# Patient Record
Sex: MALE | Race: WHITE
[De-identification: names, ages, dates, MRNs, and addresses within clinical notes are randomized per-mention and may not be internally consistent; named-entity substitution may affect disease eponyms.]

---

## 2018-08-26 ENCOUNTER — HOSPITAL ENCOUNTER (INPATIENT)
Dept: HOSPITAL 54 - ER | Age: 63
LOS: 2 days | Discharge: HOME | DRG: 205 | End: 2018-08-28
Attending: NURSE PRACTITIONER | Admitting: NURSE PRACTITIONER
Payer: COMMERCIAL

## 2018-08-26 VITALS — BODY MASS INDEX: 29.68 KG/M2 | WEIGHT: 212 LBS | HEIGHT: 71 IN

## 2018-08-26 DIAGNOSIS — E66.9: ICD-10-CM

## 2018-08-26 DIAGNOSIS — R07.9: ICD-10-CM

## 2018-08-26 DIAGNOSIS — D72.829: ICD-10-CM

## 2018-08-26 DIAGNOSIS — E78.5: ICD-10-CM

## 2018-08-26 DIAGNOSIS — F41.9: ICD-10-CM

## 2018-08-26 DIAGNOSIS — I10: ICD-10-CM

## 2018-08-26 DIAGNOSIS — M94.0: Primary | ICD-10-CM

## 2018-08-26 DIAGNOSIS — I25.2: ICD-10-CM

## 2018-08-26 DIAGNOSIS — K21.9: ICD-10-CM

## 2018-08-26 DIAGNOSIS — I25.10: ICD-10-CM

## 2018-08-26 DIAGNOSIS — E87.1: ICD-10-CM

## 2018-08-26 DIAGNOSIS — K27.9: ICD-10-CM

## 2018-08-26 DIAGNOSIS — Z88.0: ICD-10-CM

## 2018-08-26 DIAGNOSIS — N17.0: ICD-10-CM

## 2018-08-26 DIAGNOSIS — I16.0: ICD-10-CM

## 2018-08-26 DIAGNOSIS — Z82.49: ICD-10-CM

## 2018-08-26 DIAGNOSIS — Z98.61: ICD-10-CM

## 2018-08-26 DIAGNOSIS — Z87.19: ICD-10-CM

## 2018-08-26 LAB
APTT PPP: 25 SEC (ref 23–34)
BASOPHILS # BLD AUTO: 0 /CMM (ref 0–0.2)
BASOPHILS NFR BLD AUTO: 0.3 % (ref 0–2)
BUN SERPL-MCNC: 27 MG/DL (ref 7–18)
CALCIUM SERPL-MCNC: 9.1 MG/DL (ref 8.5–10.1)
CHLORIDE SERPL-SCNC: 99 MMOL/L (ref 98–107)
CO2 SERPL-SCNC: 25 MMOL/L (ref 21–32)
CREAT SERPL-MCNC: 1.4 MG/DL (ref 0.6–1.3)
EOSINOPHIL NFR BLD AUTO: 0.1 % (ref 0–6)
GLUCOSE SERPL-MCNC: 143 MG/DL (ref 74–106)
HCT VFR BLD AUTO: 51 % (ref 39–51)
HGB BLD-MCNC: 16.8 G/DL (ref 13.5–17.5)
INR PPP: 0.96 (ref 0.87–1.13)
LYMPHOCYTES NFR BLD AUTO: 1.2 /CMM (ref 0.8–4.8)
LYMPHOCYTES NFR BLD AUTO: 9.4 % (ref 20–44)
MCH RBC QN AUTO: 31 PG (ref 26–33)
MCHC RBC AUTO-ENTMCNC: 33 G/DL (ref 31–36)
MCV RBC AUTO: 93 FL (ref 80–96)
MONOCYTES NFR BLD AUTO: 0.4 /CMM (ref 0.1–1.3)
MONOCYTES NFR BLD AUTO: 3.3 % (ref 2–12)
NEUTROPHILS # BLD AUTO: 11.4 /CMM (ref 1.8–8.9)
NEUTROPHILS NFR BLD AUTO: 86.9 % (ref 43–81)
NT-PROBNP SERPL-MCNC: 103 PG/ML (ref 0–125)
PLATELET # BLD AUTO: 245 /CMM (ref 150–450)
POTASSIUM SERPL-SCNC: 3.7 MMOL/L (ref 3.5–5.1)
RBC # BLD AUTO: 5.48 MIL/UL (ref 4.5–6)
RDW COEFFICIENT OF VARIATION: 12.8 (ref 11.5–15)
SODIUM SERPL-SCNC: 135 MMOL/L (ref 136–145)
TROPONIN I SERPL-MCNC: < 0.017 NG/ML (ref 0–0.06)
WBC NRBC COR # BLD AUTO: 13.1 K/UL (ref 4.3–11)

## 2018-08-26 PROCEDURE — C9113 INJ PANTOPRAZOLE SODIUM, VIA: HCPCS

## 2018-08-26 PROCEDURE — Z7610: HCPCS

## 2018-08-26 PROCEDURE — A4606 OXYGEN PROBE USED W OXIMETER: HCPCS

## 2018-08-26 NOTE — NUR
BBRA FROM HOME C/C PRESSURE LIKE CP NON RADIATING X 3 HRS PTA W/ +SOB, +N/V 
WITH WEAKNESS. 1 NITRO  ASA GIVEN PRIOR TO ARRIVAL. PT IS HYPERTENSIVE 
BU OTHERWISEE VSS NO ACUTE DISTRESS AT THIS TIME. X1 VOMIT. WILL CONTINUE TO 
MONITOR FOR ANY CHANGES DURING THE SHIFT.

## 2018-08-27 VITALS — SYSTOLIC BLOOD PRESSURE: 133 MMHG | DIASTOLIC BLOOD PRESSURE: 64 MMHG

## 2018-08-27 VITALS — SYSTOLIC BLOOD PRESSURE: 123 MMHG | DIASTOLIC BLOOD PRESSURE: 74 MMHG

## 2018-08-27 VITALS — DIASTOLIC BLOOD PRESSURE: 103 MMHG | SYSTOLIC BLOOD PRESSURE: 169 MMHG

## 2018-08-27 VITALS — SYSTOLIC BLOOD PRESSURE: 145 MMHG | DIASTOLIC BLOOD PRESSURE: 74 MMHG

## 2018-08-27 VITALS — SYSTOLIC BLOOD PRESSURE: 137 MMHG | DIASTOLIC BLOOD PRESSURE: 81 MMHG

## 2018-08-27 VITALS — DIASTOLIC BLOOD PRESSURE: 76 MMHG | SYSTOLIC BLOOD PRESSURE: 121 MMHG

## 2018-08-27 LAB
ALBUMIN SERPL BCP-MCNC: 3.6 G/DL (ref 3.4–5)
ALP SERPL-CCNC: 42 U/L (ref 46–116)
ALT SERPL W P-5'-P-CCNC: 32 U/L (ref 12–78)
AST SERPL W P-5'-P-CCNC: 25 U/L (ref 15–37)
BASOPHILS # BLD AUTO: 0 /CMM (ref 0–0.2)
BASOPHILS NFR BLD AUTO: 0.2 % (ref 0–2)
BILIRUB SERPL-MCNC: 0.7 MG/DL (ref 0.2–1)
BUN SERPL-MCNC: 30 MG/DL (ref 7–18)
CALCIUM SERPL-MCNC: 8.6 MG/DL (ref 8.5–10.1)
CHLORIDE SERPL-SCNC: 100 MMOL/L (ref 98–107)
CHOLEST SERPL-MCNC: 185 MG/DL (ref ?–200)
CO2 SERPL-SCNC: 23 MMOL/L (ref 21–32)
CREAT SERPL-MCNC: 1.2 MG/DL (ref 0.6–1.3)
EOSINOPHIL NFR BLD AUTO: 0 % (ref 0–6)
GLUCOSE SERPL-MCNC: 132 MG/DL (ref 74–106)
HCT VFR BLD AUTO: 46 % (ref 39–51)
HDLC SERPL-MCNC: 51 MG/DL (ref 40–60)
HGB BLD-MCNC: 15.4 G/DL (ref 13.5–17.5)
LDLC SERPL DIRECT ASSAY-MCNC: 114 MG/DL (ref 0–99)
LYMPHOCYTES NFR BLD AUTO: 1.6 /CMM (ref 0.8–4.8)
LYMPHOCYTES NFR BLD AUTO: 12.8 % (ref 20–44)
MAGNESIUM SERPL-MCNC: 2.3 MG/DL (ref 1.8–2.4)
MCH RBC QN AUTO: 32 PG (ref 26–33)
MCHC RBC AUTO-ENTMCNC: 34 G/DL (ref 31–36)
MCV RBC AUTO: 94 FL (ref 80–96)
MONOCYTES NFR BLD AUTO: 0.6 /CMM (ref 0.1–1.3)
MONOCYTES NFR BLD AUTO: 5.1 % (ref 2–12)
NEUTROPHILS # BLD AUTO: 9.9 /CMM (ref 1.8–8.9)
NEUTROPHILS NFR BLD AUTO: 81.9 % (ref 43–81)
PHOSPHATE SERPL-MCNC: 4.8 MG/DL (ref 2.5–4.9)
PLATELET # BLD AUTO: 222 /CMM (ref 150–450)
POTASSIUM SERPL-SCNC: 4.2 MMOL/L (ref 3.5–5.1)
PROT SERPL-MCNC: 7 G/DL (ref 6.4–8.2)
RBC # BLD AUTO: 4.86 MIL/UL (ref 4.5–6)
RDW COEFFICIENT OF VARIATION: 12.7 (ref 11.5–15)
SODIUM SERPL-SCNC: 134 MMOL/L (ref 136–145)
TRIGL SERPL-MCNC: 74 MG/DL (ref 30–150)
TSH SERPL DL<=0.005 MIU/L-ACNC: 1.22 UIU/ML (ref 0.36–3.74)
WBC NRBC COR # BLD AUTO: 12.1 K/UL (ref 4.3–11)

## 2018-08-27 RX ADMIN — Medication SCH MG: at 17:00

## 2018-08-27 RX ADMIN — NITROGLYCERIN SCH GM: 20 OINTMENT TOPICAL at 06:06

## 2018-08-27 RX ADMIN — ASPIRIN SCH MG: 325 TABLET, FILM COATED ORAL at 08:16

## 2018-08-27 RX ADMIN — ALUMINUM HYDROXIDE, MAGNESIUM HYDROXIDE, AND SIMETHICONE PRN ML: 200; 200; 20 SUSPENSION ORAL at 06:54

## 2018-08-27 RX ADMIN — ALUMINUM HYDROXIDE, MAGNESIUM HYDROXIDE, AND SIMETHICONE PRN ML: 200; 200; 20 SUSPENSION ORAL at 01:15

## 2018-08-27 RX ADMIN — ENOXAPARIN SODIUM SCH MG: 40 INJECTION SUBCUTANEOUS at 21:24

## 2018-08-27 RX ADMIN — ATENOLOL SCH MG: 50 TABLET ORAL at 08:17

## 2018-08-27 RX ADMIN — SODIUM CHLORIDE SCH MG: 9 INJECTION, SOLUTION INTRAVENOUS at 01:15

## 2018-08-27 RX ADMIN — NITROGLYCERIN SCH GM: 20 OINTMENT TOPICAL at 17:01

## 2018-08-27 RX ADMIN — NITROGLYCERIN SCH GM: 20 OINTMENT TOPICAL at 11:23

## 2018-08-27 RX ADMIN — ENOXAPARIN SODIUM SCH MG: 40 INJECTION SUBCUTANEOUS at 01:16

## 2018-08-27 RX ADMIN — CLOPIDOGREL BISULFATE SCH MG: 75 TABLET, FILM COATED ORAL at 08:16

## 2018-08-27 RX ADMIN — NITROGLYCERIN SCH GM: 20 OINTMENT TOPICAL at 01:17

## 2018-08-27 RX ADMIN — SODIUM CHLORIDE SCH MG: 9 INJECTION, SOLUTION INTRAVENOUS at 21:24

## 2018-08-27 RX ADMIN — Medication SCH MG: at 11:22

## 2018-08-27 NOTE — NUR
MS/RN   End note



Patient stating that pain has now gone, pain scale 0/10. Stating that more comfortable since 
bowel movement. Has remained NSR on monitor, blood pressure controlled with regular 
scheduled medications. All needs attended, will endorse to night shift.

## 2018-08-27 NOTE — NUR
RN NOTES

PER ASHVIN SILVA ORDER MAALOX 30ML PO Q6H PRN, AND PROTONIX 40MG IV DAILY. WILL ADMINISTER AND 
CONTINUE TO MONITOR.

## 2018-08-27 NOTE — NUR
RN CLOSING NOTES

PT AWAKE AND RESTING IN BED. NO COMPLAINTS OF CHEST PAIN OR SOB. PT TELE MONITORED NSR RATE 
65. PT HAS A LEFT AC IV #18, INTACT AND PATENT. SAFETY PRECAUTIONS IN PLACE, BED IN LOWEST 
LOCKED POSITION, X2 SIDE RAILS UP, AND CALL LIGHT WITHIN REACH. WILL ENDORSE TO DAY SHIFT 
NURSE FOR CONTINUITY OF CARE.

## 2018-08-27 NOTE — NUR
MS/RN   Constipation



Complaining of no BM for past three or four days, Dr Morales informed and new orders given. 
Colace and senna administered, will monitor effectiveness

## 2018-08-27 NOTE — NUR
MS/RN   Patient received



Patient received from night shift.

A/O X4, vital signs stable. States that chest pain is currently 4/10 and much improved since 
las t night. Feels that the pain is more GI in nature, described as burning type pain with 
no radiation.

Safety measures in place, call light within reach and able to demonstrate how to call for 
help. Will continue to monitor and ensure safety.

## 2018-08-27 NOTE — NUR
MS/RN   S/B Dr Morales



Seen by Dr Morales - labs ordered for tomorrow, awaiting cardiology consult.

## 2018-08-27 NOTE — NUR
RN OPENING NOTES

PT AWAKE AND RESTING IN BED. PT ALERT AND ORIENTED. PT TELE MONITORED AT SINUS RHYTHM. PT 
HAS A LEFT AC #18 INTACT AND PATENT. NO COMPLAINTS OF PAIN, SOB OR DISTRESS AT THIS TIME. 
SAFETY PRECAUTIONS IN PLACE, BED IN LOWEST LOCKED POSITION, X2 SIDE RAILS UP AND CALL LIGHT 
WITHIN REACH. WILL CONTINUE TO MONITOR.

## 2018-08-27 NOTE — NUR
RN ADMISSION NOTES

PT ARRIVED ON TO THE UNIT @0028 VIA [facility] ACCOMPANIED BY HIS WIFE. PT AMBULATED FROM GURNEY 
TO BED. GAIT STEADY. PT COMPLAINS OF CHEST PAIN, AND N/V FOR 3 DAYS, STATED TODAY WAS WORSE. 
NO COMPLAINTS OF CHEST PAIN AT THIS TIME. PT TELE MONITORED NSR. PT HAS A LEFT AC IV #18, 
INTACT AND PATENT. PT COMPLAINS OF HEART BURN AND UPSET STOMACH WILL FOLLOW UP WITH 
ADMITTING DR ASHVIN SILVA. ALL PT BELONGINGS ACCOUNTED FOR AND DOCUMENTED. PT ORIENTED TO THE 
USE OF THE CALL LIGHT. SAFETY PRECAUTIONS IN PLACE, BED IN LOWEST LOCKED POSITION, X2 SIDE 
RAILS UP, AND CALL LIGHT WITHIN REACH. WILL CONTINUE TO MONITOR.

## 2018-08-27 NOTE — NUR
RN NOTES

PT STATED THAT HE HAD A HEADACHE. REMOVED NITRO PATCH AND ADMINISTERED TYLENOL. WILL 
CONTINUE TO MONITOR.

## 2018-08-28 VITALS — SYSTOLIC BLOOD PRESSURE: 112 MMHG | DIASTOLIC BLOOD PRESSURE: 62 MMHG

## 2018-08-28 VITALS — SYSTOLIC BLOOD PRESSURE: 134 MMHG | DIASTOLIC BLOOD PRESSURE: 80 MMHG

## 2018-08-28 VITALS — SYSTOLIC BLOOD PRESSURE: 122 MMHG | DIASTOLIC BLOOD PRESSURE: 83 MMHG

## 2018-08-28 VITALS — SYSTOLIC BLOOD PRESSURE: 134 MMHG | DIASTOLIC BLOOD PRESSURE: 67 MMHG

## 2018-08-28 VITALS — DIASTOLIC BLOOD PRESSURE: 84 MMHG | SYSTOLIC BLOOD PRESSURE: 131 MMHG

## 2018-08-28 LAB
BASOPHILS # BLD AUTO: 0 /CMM (ref 0–0.2)
BASOPHILS NFR BLD AUTO: 0.4 % (ref 0–2)
BUN SERPL-MCNC: 25 MG/DL (ref 7–18)
CALCIUM SERPL-MCNC: 8.5 MG/DL (ref 8.5–10.1)
CHLORIDE SERPL-SCNC: 102 MMOL/L (ref 98–107)
CO2 SERPL-SCNC: 29 MMOL/L (ref 21–32)
CREAT SERPL-MCNC: 1.4 MG/DL (ref 0.6–1.3)
EOSINOPHIL NFR BLD AUTO: 1 % (ref 0–6)
GLUCOSE SERPL-MCNC: 94 MG/DL (ref 74–106)
HCT VFR BLD AUTO: 46 % (ref 39–51)
HGB BLD-MCNC: 15.3 G/DL (ref 13.5–17.5)
IRON SERPL-MCNC: 71 UG/DL (ref 50–175)
LYMPHOCYTES NFR BLD AUTO: 2.9 /CMM (ref 0.8–4.8)
LYMPHOCYTES NFR BLD AUTO: 26 % (ref 20–44)
MAGNESIUM SERPL-MCNC: 2.4 MG/DL (ref 1.8–2.4)
MCH RBC QN AUTO: 31 PG (ref 26–33)
MCHC RBC AUTO-ENTMCNC: 33 G/DL (ref 31–36)
MCV RBC AUTO: 94 FL (ref 80–96)
MONOCYTES NFR BLD AUTO: 1.1 /CMM (ref 0.1–1.3)
MONOCYTES NFR BLD AUTO: 9.6 % (ref 2–12)
NEUTROPHILS # BLD AUTO: 7 /CMM (ref 1.8–8.9)
NEUTROPHILS NFR BLD AUTO: 63 % (ref 43–81)
PHOSPHATE SERPL-MCNC: 3 MG/DL (ref 2.5–4.9)
PLATELET # BLD AUTO: 200 /CMM (ref 150–450)
POTASSIUM SERPL-SCNC: 4.6 MMOL/L (ref 3.5–5.1)
RBC # BLD AUTO: 4.92 MIL/UL (ref 4.5–6)
RDW COEFFICIENT OF VARIATION: 12.8 (ref 11.5–15)
SODIUM SERPL-SCNC: 137 MMOL/L (ref 136–145)
TIBC SERPL-MCNC: 314 UG/DL (ref 250–450)
TSH SERPL DL<=0.005 MIU/L-ACNC: 1.6 UIU/ML (ref 0.36–3.74)
WBC NRBC COR # BLD AUTO: 11.1 K/UL (ref 4.3–11)

## 2018-08-28 RX ADMIN — NITROGLYCERIN SCH GM: 20 OINTMENT TOPICAL at 05:20

## 2018-08-28 RX ADMIN — CLOPIDOGREL BISULFATE SCH MG: 75 TABLET, FILM COATED ORAL at 08:10

## 2018-08-28 RX ADMIN — NITROGLYCERIN SCH GM: 20 OINTMENT TOPICAL at 00:38

## 2018-08-28 RX ADMIN — NITROGLYCERIN SCH GM: 20 OINTMENT TOPICAL at 12:00

## 2018-08-28 RX ADMIN — ASPIRIN SCH MG: 325 TABLET, FILM COATED ORAL at 08:11

## 2018-08-28 RX ADMIN — Medication SCH MG: at 08:12

## 2018-08-28 RX ADMIN — ATENOLOL SCH MG: 50 TABLET ORAL at 08:10

## 2018-08-28 NOTE — NUR
MS RN PT SEEN BY DR. KIDD



PT SEEN AND EXAMINED BY DR. KIDD, CLEARED FOR D/C AND TO FOLLOW UP WITH OWN CARDIOLOGIST IN 
ONE WEEK.

## 2018-08-28 NOTE — NUR
MS RN DISCHARGE PLAN



PT SEEN AND EXAMINED BY YESSICA DUMONT. PT CLEARED FOR D/C TO HOME TODAY. PRESCRIPTION WROTE FOR 
ATENOLOL, PLAVIX, AND ATORVASTATIN. EDUCATED PT AS TO WHAT EACH DRUG WAS FOR AND POSSIBLE 
SIDE EFFECTS. PT STATED UNDERSTANDING. EXIT CARE COMPLETED AND SIGNED BY PT. PROVIDED WITH 
COPY OF MEDICAL RECORD AND EXIT CARE. ALL PERSONAL BELONGING ACCOUNTED FOR ON BELONGINGS 
LIST. NAME BAND AND TELE MONITOR REMOVED, HEP LOCK REMOVED, CATHETER INTACT AND PRESSURE 
DRESSING APPLIED. TIME ALLOWED FOR ALL QUESTIONS AND CONERNS TO BE ADDRESSED. AWAITING WIFE 
TO PROVIDE TRANSPORT HOME.

## 2018-08-28 NOTE — NUR
TELE RN OPENING NOTE



RECEIVED PT IN BED FROM NIGHT SHIFT RN, [id number], DENIES N/V, SOB, AND PAIN. SR ON CARDIAC 
MONITOR. ALL NEEDS ATTENDED, SAFETY MEASURES IN PLACE, CALL LIGHT WITHIN REACH. WILL 
CONTINUE TO MONITOR AND INSURE SAFETY.